# Patient Record
Sex: MALE | Race: WHITE | URBAN - METROPOLITAN AREA
[De-identification: names, ages, dates, MRNs, and addresses within clinical notes are randomized per-mention and may not be internally consistent; named-entity substitution may affect disease eponyms.]

---

## 2024-01-31 ENCOUNTER — APPOINTMENT (OUTPATIENT)
Dept: UROLOGY | Facility: CLINIC | Age: 34
End: 2024-01-31
Payer: COMMERCIAL

## 2024-02-06 ENCOUNTER — OFFICE VISIT (OUTPATIENT)
Dept: UROLOGY | Facility: CLINIC | Age: 34
End: 2024-02-06
Payer: COMMERCIAL

## 2024-02-06 VITALS
SYSTOLIC BLOOD PRESSURE: 142 MMHG | WEIGHT: 181 LBS | HEIGHT: 72 IN | BODY MASS INDEX: 24.52 KG/M2 | TEMPERATURE: 97.8 F | DIASTOLIC BLOOD PRESSURE: 78 MMHG | HEART RATE: 78 BPM

## 2024-02-06 DIAGNOSIS — Z30.09 VASECTOMY EVALUATION: Primary | ICD-10-CM

## 2024-02-06 PROCEDURE — 99203 OFFICE O/P NEW LOW 30 MIN: CPT | Performed by: UROLOGY

## 2024-02-06 RX ORDER — BISMUTH SUBSALICYLATE 262 MG
1 TABLET,CHEWABLE ORAL DAILY
COMMUNITY

## 2024-02-06 RX ORDER — ASCORBIC ACID 500 MG
500 TABLET ORAL DAILY
COMMUNITY

## 2024-02-06 NOTE — PROGRESS NOTES
Subjective   Patient ID: Desmond Mcdonald is a 33 y.o. male who presents for VASECTOMY CONSULT (/).    HPI  The patient is accompanied by his wife. The patient is  and his wife is in agreement. The patient has 1 daughter, Judi, who is 3 months old. The patient has no contributory urologic history including epididymitis, orchitis, STD, UTD, and hernia repairs. Patient had vein thrombosis in his upper arm that was treated when he was 17 years old.     He is not on any medication and has no past surgeries. He works as .     Past Medical History  No past medical history on file.     Surgical History  No past surgical history on file.      Social History  He has no history on file for tobacco use, alcohol use, and drug use.    Family History  No family history on file.    Medications  No current outpatient medications on file.     Allergies  Patient has no known allergies.     Review of Systems  A 12 system review was completed and is negative with the exception of those signs and symptoms noted in the history of present illness.    Objective   Physical Exam  General: in NAD, appears stated age  Head: normocephalic, atraumatic  Neck: supple; trachea is midline  Respiratory: normal effort, no use of accessory muscles  Cardiovascular: no peripheral edema  Abdomen: soft, nondistended, nontender, no rebound or guarding, no organomegaly, no CVA tenderness, no hernia  Lymphatic: no lymphadenopathy noted  Skin: normal turgor, no rashes  Neurologic: grossly intact, oriented to person/place/time  Psychiatric: mode and affect appropriate  : normal phallus, normal meatus, scrotum normal, testicles normal bilaterally, epididymis normal bilaterally    Assessment/Plan   Problem List Items Addressed This Visit    None  Visit Diagnoses         Codes    Vasectomy evaluation    -  Primary Z30.09          We had a lengthy discussion today regarding vasectomy. We discussed the procedure itself, the expected recovery course,  and potential complications. Specifically, we discussed the likelihood of postoperative swelling and the need for limited activity for 48 hours. We discussed the activity restrictions that are in place  for a full week. We discussed the risk of bleeding, wound infection, chronic orchalgia, and recannulization of the vas deferens. We discussed that after the procedure, one is not immediately sterile and should continue with means of contraception until a negative semen analysis is obtained. After our discussion today, patient is agreeable to proceed and we will schedule the procedure in the near future.    All questions and concerns were addressed. Patient verbalizes understanding and has no other questions at this time.      Scribe Attestation  By signing my name below, I, Lance Brown   attest that this documentation has been prepared under the direction and in the presence of Pranav Watkins MD.

## 2024-06-28 ENCOUNTER — APPOINTMENT (OUTPATIENT)
Dept: UROLOGY | Facility: CLINIC | Age: 34
End: 2024-06-28
Payer: COMMERCIAL

## 2024-10-18 ENCOUNTER — APPOINTMENT (OUTPATIENT)
Dept: UROLOGY | Facility: CLINIC | Age: 34
End: 2024-10-18
Payer: COMMERCIAL

## 2024-10-18 VITALS
SYSTOLIC BLOOD PRESSURE: 130 MMHG | DIASTOLIC BLOOD PRESSURE: 71 MMHG | WEIGHT: 165 LBS | BODY MASS INDEX: 22.38 KG/M2 | TEMPERATURE: 98.4 F | HEART RATE: 67 BPM

## 2024-10-18 DIAGNOSIS — Z30.2 ENCOUNTER FOR VASECTOMY: ICD-10-CM

## 2024-10-18 PROCEDURE — 55250 REMOVAL OF SPERM DUCT(S): CPT | Performed by: UROLOGY

## 2024-10-18 NOTE — PROGRESS NOTES
Preoperative diagnosis: Elective sterilization  Postoperative diagnosis: Same    Procedure: Patient was placed in the supine position. His genitalia were prepped and draped in the usual sterile fashion. A 5 mL of 1% lidocaine were used to anesthetize a small area in the right hemiscrotum. A small stab incision was made and the vas deferens was identified. The vas deference was then delivered through the incision. A 2 cm segment was isolated and removed. The ends were cauterized and then suture ligated with a 3-0 silk stitch. There was good hemostasis. The 2 ends were placed back in the right hemiscrotum and the skin was closed with a single interrupted 3-0 chromic stitch. The exact same procedure was repeated on the left side. The patient tolerated the procedure well.    Plan: Patient will follow-up in 2 months with a semen analysis to document sterility

## 2024-12-30 ENCOUNTER — ANCILLARY PROCEDURE (OUTPATIENT)
Dept: ENDOCRINOLOGY | Facility: CLINIC | Age: 34
End: 2024-12-30
Payer: COMMERCIAL

## 2024-12-30 DIAGNOSIS — Z30.2 ENCOUNTER FOR VASECTOMY: ICD-10-CM

## 2024-12-30 LAB
ABSTINENCE (DAYS): 1 DAYS (ref 2–7)
AGGLUTINATION (SEMEN): ABNORMAL
ANALYZED TIME:: ABNORMAL
ANDROLOGY LAB ID#: ABNORMAL
CLUMPS (SEMEN): ABNORMAL
COLLECTED COMPLETELY: YES
COLLECTION LOCATION:: ABNORMAL
COLLECTION METHOD:: ABNORMAL
CONCENTRATION CN (POST-WASH): 0 MILL/ML
CONCENTRATION(SEMEN): 0 MILL/ML (ref 15–?)
DEBRIS (SEMEN): YES
LEUKOCYTE (SEMEN): ABNORMAL
NON PROG. MOTILITY (SEMEN): 0 %
NON PROG. MOTILITY CN (POST-WASH): 0 %
PROG. MOTILITY (SEMEN): 0 % (ref 32–?)
PROG. MOTILITY CN (POST-WASH): 0 %
RECEIVED TIME:: ABNORMAL
SEMEN APPEARANCE: NORMAL
SEMEN LIQUEFACTION: NORMAL
SEMEN VISCOSITY: NORMAL
TOTAL MOTILITY (SEMEN): 0 % (ref 40–?)
TOTAL MOTILITY CN (POST-WASH): 0 %
TOTAL NO OF MOTILE (SEMEN): 0 MILL
TOTAL NO OF MOTILE CN (POST-WASH): 0 MILL
TOTAL NO OF RND CELLS (SEMEN): 0.3 MILL (ref ?–5)
TOTAL NO OF SPERM (SEMEN): 0 MILL (ref 39–?)
TOTAL NO OF SPERM CN (POST-WASH): 0 MILL
VOLUME (SEMEN): 2 ML (ref 1.5–?)
VOLUME CN (POST-WASH): 0.3 ML

## 2024-12-30 PROCEDURE — 89321 SEMEN ANAL SPERM DETECTION: CPT | Performed by: OBSTETRICS & GYNECOLOGY
